# Patient Record
Sex: FEMALE | Race: WHITE | Employment: OTHER | URBAN - METROPOLITAN AREA
[De-identification: names, ages, dates, MRNs, and addresses within clinical notes are randomized per-mention and may not be internally consistent; named-entity substitution may affect disease eponyms.]

---

## 2019-07-29 ENCOUNTER — HOSPITAL ENCOUNTER (OUTPATIENT)
Dept: NON INVASIVE DIAGNOSTICS | Age: 79
Discharge: HOME | End: 2019-07-29
Payer: MEDICARE

## 2019-07-29 LAB
LV EF: 60 %
LVEF MODALITY: NORMAL

## 2019-07-29 PROCEDURE — 93306 TTE W/DOPPLER COMPLETE: CPT

## 2019-10-25 ENCOUNTER — HOSPITAL ENCOUNTER (OUTPATIENT)
Age: 79
Discharge: HOME | End: 2019-10-27
Payer: MEDICARE

## 2019-10-25 LAB
APTT: 29.4 SEC (ref 24.5–35.1)
INR BLD: 1.5
PROTHROMBIN TIME: 17.2 SEC (ref 9.3–12.4)

## 2019-10-25 PROCEDURE — 85730 THROMBOPLASTIN TIME PARTIAL: CPT

## 2019-10-25 PROCEDURE — 85610 PROTHROMBIN TIME: CPT

## 2021-04-23 ENCOUNTER — HOSPITAL ENCOUNTER (EMERGENCY)
Age: 81
Discharge: HOME | End: 2021-04-23
Payer: MEDICARE

## 2021-04-23 ENCOUNTER — APPOINTMENT (OUTPATIENT)
Dept: GENERAL RADIOLOGY | Age: 81
End: 2021-04-23
Payer: MEDICARE

## 2021-04-23 VITALS
OXYGEN SATURATION: 95 % | BODY MASS INDEX: 24.39 KG/M2 | SYSTOLIC BLOOD PRESSURE: 163 MMHG | HEIGHT: 59 IN | TEMPERATURE: 98 F | DIASTOLIC BLOOD PRESSURE: 86 MMHG | HEART RATE: 75 BPM | RESPIRATION RATE: 16 BRPM | WEIGHT: 121 LBS

## 2021-04-23 DIAGNOSIS — M25.552 LEFT HIP PAIN: ICD-10-CM

## 2021-04-23 DIAGNOSIS — M54.32 SCIATICA OF LEFT SIDE: Primary | ICD-10-CM

## 2021-04-23 PROCEDURE — 6370000000 HC RX 637 (ALT 250 FOR IP): Performed by: PHYSICIAN ASSISTANT

## 2021-04-23 PROCEDURE — 6360000002 HC RX W HCPCS: Performed by: PHYSICIAN ASSISTANT

## 2021-04-23 PROCEDURE — 72100 X-RAY EXAM L-S SPINE 2/3 VWS: CPT

## 2021-04-23 PROCEDURE — 99285 EMERGENCY DEPT VISIT HI MDM: CPT

## 2021-04-23 PROCEDURE — 73502 X-RAY EXAM HIP UNI 2-3 VIEWS: CPT

## 2021-04-23 RX ORDER — MULTIVITAMIN/IRON/FOLIC ACID 18MG-0.4MG
1 TABLET ORAL DAILY
COMMUNITY

## 2021-04-23 RX ORDER — LATANOPROST 50 UG/ML
1 SOLUTION/ DROPS OPHTHALMIC NIGHTLY
COMMUNITY

## 2021-04-23 RX ORDER — CHLORAL HYDRATE 500 MG
1000 CAPSULE ORAL EVERY MORNING
COMMUNITY

## 2021-04-23 RX ORDER — ANTACID TABLETS 648 MG/1
650 TABLET, CHEWABLE ORAL
COMMUNITY

## 2021-04-23 RX ORDER — DEXAMETHASONE 4 MG/1
6 TABLET ORAL ONCE
Status: COMPLETED | OUTPATIENT
Start: 2021-04-23 | End: 2021-04-23

## 2021-04-23 RX ORDER — LEVOTHYROXINE SODIUM 88 UG/1
1 TABLET ORAL DAILY
COMMUNITY

## 2021-04-23 RX ORDER — LIDOCAINE 50 MG/G
1 PATCH TOPICAL EVERY 24 HOURS
Qty: 10 PATCH | Refills: 0 | Status: SHIPPED | OUTPATIENT
Start: 2021-04-23 | End: 2021-05-03

## 2021-04-23 RX ORDER — ATORVASTATIN CALCIUM 10 MG/1
40 TABLET, FILM COATED ORAL NIGHTLY
COMMUNITY

## 2021-04-23 RX ORDER — MULTIVITAMIN,THERAPEUTIC
1 TABLET ORAL EVERY MORNING
COMMUNITY

## 2021-04-23 RX ORDER — PREDNISONE 10 MG/1
40 TABLET ORAL DAILY
Qty: 20 TABLET | Refills: 0 | Status: SHIPPED | OUTPATIENT
Start: 2021-04-23 | End: 2021-04-28

## 2021-04-23 RX ORDER — ACETAMINOPHEN 500 MG
1000 TABLET ORAL ONCE
Status: COMPLETED | OUTPATIENT
Start: 2021-04-23 | End: 2021-04-23

## 2021-04-23 RX ADMIN — DEXAMETHASONE 6 MG: 4 TABLET ORAL at 02:25

## 2021-04-23 RX ADMIN — ACETAMINOPHEN 1000 MG: 500 TABLET ORAL at 02:25

## 2021-04-23 ASSESSMENT — PAIN DESCRIPTION - PAIN TYPE: TYPE: ACUTE PAIN

## 2021-04-23 ASSESSMENT — PAIN DESCRIPTION - LOCATION: LOCATION: HIP

## 2021-04-23 ASSESSMENT — PAIN DESCRIPTION - ONSET: ONSET: ON-GOING

## 2021-04-23 NOTE — ED PROVIDER NOTES
Independent MLP  HPI:  4/23/21, Time: 2:11 AM EDT         Leah Mendez is a 79 y.o. female presenting to the ED for left leg pain , beginning yesterday  ago. The complaint has been persistent, moderate in severity, and worsened by sitting, walking. Patient comes in with complaint of left leg pain. She states it started around 630 yesterday   Pain radiates from her hip down towards the lower leg. She denies any known injury. She was driving from [email or web address]. She does have a history of blood clots is on Eliquis. She states that the pain does not feel similar to when she had a blood clot in the past.  She denies any known injury. No abdominal pain no urinary or bowel incontinence no saddle paresthesias. No chest pain or sob     Review of Systems:   A complete review of systems was performed and pertinent positives and negatives are stated within HPI, all other systems reviewed and are negative.          --------------------------------------------- PAST HISTORY ---------------------------------------------  Past Medical History:  has a past medical history of DVT prophylaxis, Glaucoma, Right leg DVT ([address] and Thyroid disease. Past Surgical History:  has a past surgical history that includes Hysterectomy; Carpal tunnel release; other surgical history; and Tonsillectomy. Social History:  reports that she has never smoked. She has never used smokeless tobacco. She reports current alcohol use. She reports that she does not use drugs. Family History: family history is not on file. The patients home medications have been reviewed. Allergies: No known allergies    -------------------------------------------------- RESULTS -------------------------------------------------  All laboratory and radiology results have been personally reviewed by myself   LABS:  No results found for this visit on 04/23/21.     RADIOLOGY:  Interpreted by Radiologist.  XR HIP 2-3 VW W PELVIS LEFT   Final Result Mild degenerative narrowing of bilateral hip joint spaces. No acute   abnormality identified. XR LUMBAR SPINE (2-3 VIEWS)   Final Result   1. L5 on S1 grade II anterolisthesis with suspected bilateral L5 pars   interarticularis defects. 2. Multilevel minimal to mild lumbar spine spondylosis. 3. Diffuse osteopenia. 4. Multilevel mild-to-moderate facet degenerative arthropathy.             ------------------------- NURSING NOTES AND VITALS REVIEWED ---------------------------   The nursing notes within the ED encounter and vital signs as below have been reviewed. BP (!) 163/86   Pulse 75   Temp 98 °F (36.7 °C) (Temporal)   Resp 16   Ht 4' 11\" (1.499 m)   Wt 121 lb (54.9 kg)   SpO2 95%   BMI 24.44 kg/m²   Oxygen Saturation Interpretation: Normal      ---------------------------------------------------PHYSICAL EXAM--------------------------------------      Constitutional/General: Alert and oriented x3, well appearing, non toxic in NAD  Head: Normocephalic and atraumatic  Eyes: PERRL, EOMI  Mouth: Oropharynx clear, handling secretions, no trismus  Neck: Supple, full ROM,   Pulmonary: Lungs clear to auscultation bilaterally, no wheezes, rales, or rhonchi. Not in respiratory distress  Cardiovascular:  Regular rate and rhythm, no murmurs, gallops, or rubs. 2+ distal pulses  Abdomen: Soft, non tender, non distended,   Back no lumbar vertebral tenderness  Extremities: Moves all extremities x 4. Warm and well perfused tender to palpation of the left hip full range of motion of the hip present. Skin: warm and dry without rash  Neurologic: GCS 15,  Psych: Normal Affect      ------------------------------ ED COURSE/MEDICAL DECISION MAKING----------------------  Medications   dexamethasone (DECADRON) tablet 6 mg (6 mg Oral Given 4/23/21 0225)   acetaminophen (TYLENOL) tablet 1,000 mg (1,000 mg Oral Given 4/23/21 0225)         ED COURSE:   0250 patient states her pain is improving.     Medical Decision Making:    Patient came in with complaint of left leg pain that started yesterday evening. She states she was having some lower back pain over the last couple of days. She had no sign of cauda equina or discitis. She has history of DVT on Eliquis. She has clinical signs of DVT. X-ray lower lumbar degenerative changes x-ray of the hip degenerative changes. Tylenol as needed for pain she was given a prednisone burst and Lidoderm patch. Counseling: The emergency provider has spoken with the patient and discussed todays results, in addition to providing specific details for the plan of care and counseling regarding the diagnosis and prognosis. Questions are answered at this time and they are agreeable with the plan.      --------------------------------- IMPRESSION AND DISPOSITION ---------------------------------    IMPRESSION  1. Sciatica of left side    2. Left hip pain        DISPOSITION  Disposition: Discharge to home  Patient condition is good      NOTE: This report was transcribed using voice recognition software.  Every effort was made to ensure accuracy; however, inadvertent computerized transcription errors may be present     Paolo Reynolds [place]  04/23/21 [id number]